# Patient Record
Sex: FEMALE | Race: WHITE
[De-identification: names, ages, dates, MRNs, and addresses within clinical notes are randomized per-mention and may not be internally consistent; named-entity substitution may affect disease eponyms.]

---

## 2022-12-30 ENCOUNTER — HOSPITAL ENCOUNTER (EMERGENCY)
Dept: HOSPITAL 26 - MED | Age: 52
Discharge: HOME | End: 2022-12-30
Payer: COMMERCIAL

## 2022-12-30 VITALS — HEIGHT: 60 IN | WEIGHT: 135 LBS | BODY MASS INDEX: 26.5 KG/M2

## 2022-12-30 VITALS — DIASTOLIC BLOOD PRESSURE: 64 MMHG | SYSTOLIC BLOOD PRESSURE: 119 MMHG

## 2022-12-30 VITALS — SYSTOLIC BLOOD PRESSURE: 105 MMHG | DIASTOLIC BLOOD PRESSURE: 68 MMHG

## 2022-12-30 DIAGNOSIS — R19.7: ICD-10-CM

## 2022-12-30 DIAGNOSIS — N39.0: ICD-10-CM

## 2022-12-30 DIAGNOSIS — Z79.899: ICD-10-CM

## 2022-12-30 DIAGNOSIS — Z20.822: ICD-10-CM

## 2022-12-30 DIAGNOSIS — R11.2: ICD-10-CM

## 2022-12-30 DIAGNOSIS — A08.4: Primary | ICD-10-CM

## 2022-12-30 LAB
ALBUMIN FLD-MCNC: 3.1 G/DL (ref 3.4–5)
ANION GAP SERPL CALCULATED.3IONS-SCNC: 14.3 MMOL/L (ref 8–16)
APPEARANCE UR: (no result)
AST SERPL-CCNC: 19 U/L (ref 15–37)
BASOPHILS # BLD AUTO: 0 K/UL (ref 0–0.22)
BASOPHILS NFR BLD AUTO: 0.2 % (ref 0–2)
BILIRUB SERPL-MCNC: 0.2 MG/DL (ref 0–1)
BILIRUB UR QL STRIP: NEGATIVE
BUN SERPL-MCNC: 12 MG/DL (ref 7–18)
CHLORIDE SERPL-SCNC: 100 MMOL/L (ref 98–107)
CO2 SERPL-SCNC: 27.3 MMOL/L (ref 21–32)
COLOR UR: YELLOW
CREAT SERPL-MCNC: 0.8 MG/DL (ref 0.6–1.3)
EOSINOPHIL # BLD AUTO: 0 K/UL (ref 0–0.4)
EOSINOPHIL NFR BLD AUTO: 0 % (ref 0–4)
ERYTHROCYTE [DISTWIDTH] IN BLOOD BY AUTOMATED COUNT: 14 % (ref 11.6–13.7)
GFR SERPL CREATININE-BSD FRML MDRD: 98 ML/MIN (ref 90–?)
GLUCOSE SERPL-MCNC: 119 MG/DL (ref 74–106)
GLUCOSE UR STRIP-MCNC: NEGATIVE MG/DL
HCT VFR BLD AUTO: 40.7 % (ref 36–48)
HGB BLD-MCNC: 13.7 G/DL (ref 12–16)
HGB UR QL STRIP: (no result)
LEUKOCYTE ESTERASE UR QL STRIP: (no result)
LYMPHOCYTES # BLD AUTO: 1 K/UL (ref 2.5–16.5)
LYMPHOCYTES NFR BLD AUTO: 7.6 % (ref 20.5–51.1)
MCH RBC QN AUTO: 31 PG (ref 27–31)
MCHC RBC AUTO-ENTMCNC: 34 G/DL (ref 33–37)
MCV RBC AUTO: 91 FL (ref 80–94)
MONOCYTES # BLD AUTO: 0.6 K/UL (ref 0.8–1)
MONOCYTES NFR BLD AUTO: 4.1 % (ref 1.7–9.3)
NEUTROPHILS # BLD AUTO: 11.7 K/UL (ref 1.8–7.7)
NEUTROPHILS NFR BLD AUTO: 88.1 % (ref 42.2–75.2)
NITRITE UR QL STRIP: NEGATIVE
PH UR STRIP: 6 [PH] (ref 5–9)
PLATELET # BLD AUTO: 293 K/UL (ref 140–450)
POTASSIUM SERPL-SCNC: 3.6 MMOL/L (ref 3.5–5.1)
RBC # BLD AUTO: 4.47 MIL/UL (ref 4.2–5.4)
RBC #/AREA URNS HPF: (no result) /HPF (ref 0–5)
SODIUM SERPL-SCNC: 138 MMOL/L (ref 136–145)
WBC # BLD AUTO: 13.3 K/UL (ref 4.8–10.8)

## 2022-12-30 PROCEDURE — 87086 URINE CULTURE/COLONY COUNT: CPT

## 2022-12-30 PROCEDURE — 81025 URINE PREGNANCY TEST: CPT

## 2022-12-30 PROCEDURE — 96374 THER/PROPH/DIAG INJ IV PUSH: CPT

## 2022-12-30 PROCEDURE — 99284 EMERGENCY DEPT VISIT MOD MDM: CPT

## 2022-12-30 PROCEDURE — 96361 HYDRATE IV INFUSION ADD-ON: CPT

## 2022-12-30 PROCEDURE — 87426 SARSCOV CORONAVIRUS AG IA: CPT

## 2022-12-30 PROCEDURE — 36415 COLL VENOUS BLD VENIPUNCTURE: CPT

## 2022-12-30 PROCEDURE — 81001 URINALYSIS AUTO W/SCOPE: CPT

## 2022-12-30 PROCEDURE — 85025 COMPLETE CBC W/AUTO DIFF WBC: CPT

## 2022-12-30 PROCEDURE — 80053 COMPREHEN METABOLIC PANEL: CPT

## 2022-12-30 PROCEDURE — 96375 TX/PRO/DX INJ NEW DRUG ADDON: CPT

## 2022-12-30 PROCEDURE — 87804 INFLUENZA ASSAY W/OPTIC: CPT

## 2022-12-30 NOTE — NUR
Patient discharged with v/s stable. Written and verbal after care instructions 
given and explained. 

Patient alert, oriented and verbalized understanding of instructions. 
Ambulatory with steady gait. All questions addressed prior to discharge. ID 
band removed. Patient advised to follow up with PMD. Rx of Tylenol, Zofran, 
Macrobid, Mylanta given. Patient educated on indication of medication including 
possible reaction and side effects. Opportunity to ask questions provided and 
answered.

## 2022-12-30 NOTE — NUR
52F presents to ED with c/o N/V/D, body aches, fevers and chills x3days. Pt 
reports a constant, sharp like, 5/10 head pain and low back pain. Pt reports 
taking Tylenol and Advil with mild relief, denies taking today. Pt denies 
trauma/injury to lower back.